# Patient Record
Sex: MALE | Race: WHITE | NOT HISPANIC OR LATINO | Employment: PART TIME | URBAN - METROPOLITAN AREA
[De-identification: names, ages, dates, MRNs, and addresses within clinical notes are randomized per-mention and may not be internally consistent; named-entity substitution may affect disease eponyms.]

---
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
## 2024-08-15 ENCOUNTER — APPOINTMENT (OUTPATIENT)
Dept: ULTRASOUND IMAGING | Facility: CLINIC | Age: 19
End: 2024-08-15
Attending: EMERGENCY MEDICINE
Payer: OTHER GOVERNMENT

## 2024-08-15 ENCOUNTER — HOSPITAL ENCOUNTER (EMERGENCY)
Facility: CLINIC | Age: 19
Discharge: HOME | End: 2024-08-16
Attending: EMERGENCY MEDICINE | Admitting: EMERGENCY MEDICINE
Payer: OTHER GOVERNMENT

## 2024-08-15 DIAGNOSIS — R10.31 RIGHT GROIN PAIN: ICD-10-CM

## 2024-08-15 LAB
ALBUMIN UR-MCNC: 20 MG/DL
APPEARANCE UR: CLEAR
BILIRUB UR QL STRIP: NEGATIVE
COLOR UR AUTO: YELLOW
GLUCOSE UR STRIP-MCNC: NEGATIVE MG/DL
HGB UR QL STRIP: NEGATIVE
KETONES UR STRIP-MCNC: NEGATIVE MG/DL
LEUKOCYTE ESTERASE UR QL STRIP: NEGATIVE
MUCOUS THREADS #/AREA URNS LPF: PRESENT /LPF
NITRATE UR QL: NEGATIVE
PH UR STRIP: 6 [PH] (ref 5–7)
RBC URINE: <1 /HPF
SP GR UR STRIP: 1.03 (ref 1–1.03)
UROBILINOGEN UR STRIP-MCNC: 2 MG/DL
WBC URINE: 1 /HPF

## 2024-08-15 PROCEDURE — 76870 US EXAM SCROTUM: CPT

## 2024-08-15 PROCEDURE — 81001 URINALYSIS AUTO W/SCOPE: CPT | Performed by: EMERGENCY MEDICINE

## 2024-08-15 PROCEDURE — 99284 EMERGENCY DEPT VISIT MOD MDM: CPT | Mod: 25

## 2024-08-15 ASSESSMENT — COLUMBIA-SUICIDE SEVERITY RATING SCALE - C-SSRS
2. HAVE YOU ACTUALLY HAD ANY THOUGHTS OF KILLING YOURSELF IN THE PAST MONTH?: NO
6. HAVE YOU EVER DONE ANYTHING, STARTED TO DO ANYTHING, OR PREPARED TO DO ANYTHING TO END YOUR LIFE?: NO
1. IN THE PAST MONTH, HAVE YOU WISHED YOU WERE DEAD OR WISHED YOU COULD GO TO SLEEP AND NOT WAKE UP?: NO

## 2024-08-15 ASSESSMENT — ACTIVITIES OF DAILY LIVING (ADL): ADLS_ACUITY_SCORE: 35

## 2024-08-16 VITALS
WEIGHT: 115.08 LBS | BODY MASS INDEX: 19.65 KG/M2 | TEMPERATURE: 99 F | HEART RATE: 99 BPM | OXYGEN SATURATION: 99 % | SYSTOLIC BLOOD PRESSURE: 138 MMHG | RESPIRATION RATE: 18 BRPM | DIASTOLIC BLOOD PRESSURE: 98 MMHG | HEIGHT: 64 IN

## 2024-08-16 NOTE — ED NOTES
Emergency Department Attending Supervision Note        I evaluated this patient with Vorderbruggen PAC.       Briefly, the patient presented with intermittent groin pain on the right for the last 3 days.  The pain is associated with lifting but he has no recent bulging or other abnormality to his genitalia that is noticed.  Exam chaperoned by Ana Cristina PAC:  :  Normal descended testicles   Cremasteric reflex intact bilaterally   No testicular or epididymal tenderness or mass appreciated   No inguinal hernia appreciated   No penile discharge    Ultrasound shows no evidence of epididymitis, torsion, or other acute pathology.  UA is unremarkable.  Emphasized the unclear nature of symptoms.  Symptoms could represent abdominal wall strain, nerve entrapment, early hernia not noticeable yet on exam.  Discussed all the above with the patient and the importance of primary care follow-up.  Will refer for surgery if he develops symptoms of hernia.  Return precautions for worse pain, swelling, or any other concerns.    Review of external records:  Reviewed 7/24/2024 office visit    Visit Diagnosis, Associated Orders, and Comments     ICD-10-CM    1. Right groin pain  R10.31             Dony Yung MD  [facility]  [facility] Emergency Department           Dony Yung MD  08/16/24 0057

## 2024-08-16 NOTE — ED TRIAGE NOTES
Right testicle pain intermittent for 3 days. Denies swelling or redness. Pain worse with movement or carrying anything.

## 2024-08-16 NOTE — ED PROVIDER NOTES
"  Emergency Department Note      History of Present Illness     Chief Complaint   Groin Pain      HPI   Wilson Schultz is a 18 year old male who presents with groin pain. About 3 days ago, patient started developing right testicular pain. This pain came in \"little bits\" and has progressively gotten worse. He states that the pain is currently at a 2-3/10 and is a 4/10 at worst when he is moving things for work. He additionally endorses pain when he sneezes or coughs. Wilson notes that at 11 years of age, he hit his testicle and needed to present to the ER. However, this issue was resolved. Patient notes he was last sexually active 2 months ago and tested for STDs 1 month ago. Caio later noted that he occasionally gets abdominal pain that onsets and resolves quickly whenever he carried things today. Patient denies urinary symptoms.     Independent Historian   None    Review of External Notes   None     Past Medical History     Medical History and Problem List   Asthma  Reactive airway dysfunction  Allergic rhinitis  Contact dermatitis  Esophageal reflux  Strabismus non-paralytic esotropia  Fracture of radius and ulna  urgency of urination    Medications   ALBUTEROL IN  Cetirizine HCl (ZYRTEC ALLERGY PO)  Fluticasone Propionate, Inhal, (FLOVENT IN)  ibuprofen (ADVIL/MOTRIN) 200 MG tablet  metoclopramide (REGLAN) 10 MG tablet        Surgical History   Past Surgical History:   Procedure Laterality Date    LAPAROSCOPIC APPENDECTOMY N/A 8/23/2016    Procedure: LAPAROSCOPIC APPENDECTOMY;  Surgeon: Moises Higgins MD;  Location:  OR       Physical Exam     Patient Vitals for the past 24 hrs:   BP Temp Temp src Pulse Resp SpO2 Height Weight   08/15/24 2203 133/84 99  F (37.2  C) Temporal 105 16 98 % 1.626 m (5' 4\") 52.2 kg (115 lb 1.3 oz)     Physical Exam  General: Awake, alert, non-toxic.  Head:  Scalp is atraumatic.   Eyes:  Conjunctiva normal, PERRL  ENT:  The external nose and ears are normal.     Oropharynx " Referred by: Jose Vasquez DO; Medical Diagnosis (from order):    Diagnosis Information      Diagnosis    724.2 (ICD-9-CM) - M54.5 (ICD-10-CM) - Acute right-sided low back pain without sciatica                Physical Therapy -  Daily Treatment Note -  Progress Note    Visit:  8     SUBJECTIVE                                                                                                             The patient reports he still feels right sided low back discomfort especially first thing in the morning. Pain rated 1-2/10. Most of the time stretching helps.    8/8       Pain / Symptoms:  Pain rating (out of 10): Current: 1     OBJECTIVE                                                                                                                     Range of Motion (ROM)   (norms in parentheses, degrees unless noted, active unless noted):   Lumbar:    - Flexion(60-80):  75%     - Rotation (30/45):        • Left:  100%         • Right:  100%     - Side Bend (25-35):        • Left:  100%         • Right:  100%     Strength  (out of 5 unless noted, standard test position unless noted, lbs tested with hand held dynamometer)   Hip:    - Flexion:        • Left (L2-3): 5        • Right (L2-3): 5     - Abduction:        • Left (L4-5): 4+        • Right (L4-5): 4+   Lumbar:    - Modified Sahramann Lower Abdominal: Level 2 - one hip flexed to 90, alternate heel slide on floor     Palpation:     Comments / Details: TTP along right sacrum, mod tight at R piriformis muscle     Comments / Details: Outcome Measures:   OSWESTRY Total Scored: 10  OSWESTRY Total Possible Score: 45  OSWESTRY Score Calculated: 22.22 %  (0-20% = minimal disability; 20-40% = moderate disability; 40-60% = severe disability; 60-80% = crippled; % = bed bound) see flowsheet for additional documentation    TREATMENT                                                                                                                initial evaluation  clear, uvula midline.  Neck:  Normal range of motion without rigidity.  CV:  Regular rate and rhythm    No pathologic murmur, rubs, or gallops.  Resp:  Breath sounds are clear bilaterally    Non-labored, no retractions or accessory muscle use  Abdomen: Mild groin tenderness to palpation. Abdomen is soft, no distension, no masses. No CVA tenderness.  Testicular exam: Preformed in the prescience of male chaperone. Testicles without evidence of swelling, erythema or tenderness. Cremasteric reflex absent. No bulging present in the inguinal canal.   MS:  No lower extremity edema/swelling. No midline cervical, thoracic, or lumbar tenderness.  Extremities without joint swelling or redness.  Skin:  Warm and dry, No rash or lesions noted.  Neuro:  Alert and oriented.  GCS 15 Moves all extremities normal.  No facial asymmetry. Gait normal.  Psych: Awake. Alert. Normal affect. Appropriate interactions.      Diagnostics     Lab Results   Labs Ordered and Resulted from Time of ED Arrival to Time of ED Departure   ROUTINE UA WITH MICROSCOPIC - Abnormal       Result Value    Color Urine Yellow      Appearance Urine Clear      Glucose Urine Negative      Bilirubin Urine Negative      Ketones Urine Negative      Specific Gravity Urine 1.034      Blood Urine Negative      pH Urine 6.0      Protein Albumin Urine 20 (*)     Urobilinogen Urine 2.0      Nitrite Urine Negative      Leukocyte Esterase Urine Negative      Mucus Urine Present (*)     RBC Urine <1      WBC Urine 1         Imaging   US Testicular & Scrotum w Doppler Ltd   Final Result   IMPRESSION:   1.  Normal ultrasound of the scrotum.        Independent Interpretation   None    ED Course      Medications Administered   Medications - No data to display    Procedures   Procedures     Discussion of Management   None    ED Course   ED Course as of 08/15/24 2357   u Aug 15, 2024   8365 I obtained history and examined the patient as noted above.         Additional  completed  Therapeutic Exercise:  Theraband abd with bridge in hook lying  TA contraction with functional activities  Nu step x 5 min level 5  Side stepping with band RTB 5 laps* NP patient had pain with at home so discontinued for now  Hip machine abd 65# 2 sets of 10 Not performed  PPT with hip isometrics 10*  Forward step ups with opp hip flexion to 90 20x  SB stretch x3 directions 30 sec holds  sitbacks at chair 20x  Seated TA with trunk twist 20x  Toe touch balance 2x30 sec  TA with leg extension 20x*  Large balance board M-L, A-P static hold for 30 sec (inc'd knee pain)  Seated trunk twist 2x30 sec*  Side stretch at wall.*  Prone hip ext on ball 2x10*  Bridging on ball 2x10*  Standing HF stretch on chair 2x30 sec*  Supine TA with lumbar rotation on ball 20x*  Supine TA with DKTC with ball 20x    Not preformed:  SLS with hip flex, abd, ext with glid  Discussed findings and POC  Current hep: cat/camel, quadruped rocking, abdominal stretch, bird dog, clamshell, LTR, DKTC, seated forward bend under chair, side stretch at wall (R only)  Supine HS stretch with strap 2x30 sec*  Supine hip ER stretch 2x30 sec*  Standing HF stretch at step 2x30 sec B    Manual Therapy:  Pelvic MET, R ant rotation with pelvic Shotgun, SCS sacrum   STM R glut med, piriformis    Skilled input: verbal instruction/cues      ASSESSMENT                                                                                                             The patient has progressed with improved body mechanics for bending and lifting, decreased hip muscle tightness and overall improving symptoms. He demonstrates independence with home exercise program at this time.   To date the patient has made gains as expected as reported.     PLAN                                                                                                                           Updates to plan of care: Hold physical therapy until further notice    patient involved in and agreed  Documentation  None    Medical Decision Making / Diagnosis     CMS Diagnoses: None    MIPS       None    MDM   Wilson Schultz is a 18 year old male who presented to the emergency department today for evaluation of 3 days of right testicular pain that is exacerbated with movement and lifting.  See HPI for further details.  Differential diagnosis include but not limited to hernia, epididymitis, orchitis, testicular torsion, among others.  On physical exam his testicles are nontender, no evidence of swelling or erythema.  No bulging to suggest hernia in the inguinal canal.  Patient reports that his last sexual partner was 2 months ago.  Following this encounter he tested negative for STDs.  Therefore at this time I do not think STD testing is necessary.  Urinalysis showed no evidence of infection.  Testicular ultrasound showed no evidence of testicular torsion, epididymitis, hydrocele or varicocele.  Discussed with the patient that his symptoms may be an early hernia or possibly a strain to his groin.  Discussed that if he were to note a bulge in his groin or his symptoms do not improve to be evaluated by general surgery.  Phone number given here. Return precautions discussed including worsening testicular pain, fever, swelling. Patient expressed understanding. Discharged home in stable condition.     Disposition   The patient was discharged.     Diagnosis     ICD-10-CM    1. Right groin pain  R10.31            Scribe Disclosure:  I, Lila Ny, am serving as a scribe at 10:11 PM on 8/15/2024 to document services personally performed by Janay De La Paz PA-C, based on my observations and the provider's statements to me.     Janay Frias PA-C, PA-C  08/16/24 0002     to plan of care and goals.    GOALS                                                                                                                       Long Term Goals: To be met by end of plan of care:      Home Exercise Program: Independent with progressed and modified home exercise program (HEP)      Status:  Met     The patient will report <2/10 back pain with all activities of daily living.     Status:  Met   Decrease all hip/lumbar muscles to min tight to improve tolerance for prolonged positions.      Status:  Partially met   Increase all core mm by 1 grade to improve ability for walking and standing activities.      Status:  Partially met       Procedures and total treatment time documented Time Entry flowsheet.

## 2024-08-16 NOTE — DISCHARGE INSTRUCTIONS
Please make an appointment with your pcp.     If you develop a bulge in your groin or symptoms do not improve then please follow up with general surgery, phone number is above.     If you develop severe pain, fevers, changes to your testicle please return to the ED.